# Patient Record
Sex: FEMALE
[De-identification: names, ages, dates, MRNs, and addresses within clinical notes are randomized per-mention and may not be internally consistent; named-entity substitution may affect disease eponyms.]

---

## 2018-08-14 ENCOUNTER — HOSPITAL ENCOUNTER (OUTPATIENT)
Dept: HOSPITAL 88 - NM | Age: 73
End: 2018-08-14
Attending: INTERNAL MEDICINE
Payer: COMMERCIAL

## 2018-08-14 DIAGNOSIS — L89.153: Primary | ICD-10-CM

## 2018-08-14 PROCEDURE — 78315 BONE IMAGING 3 PHASE: CPT

## 2018-08-14 NOTE — DIAGNOSTIC IMAGING REPORT
Bone Scan, three-phase



Reason for exam: 73 F s/p fall 5 weeks ago with fracture left lower leg and

right upper arm; now with stage 3 sacral ulcer.  Patient is insulin-dependent

diabetic



Radiopharmaceutical:  Tc-99m MDP 26 mCi



Comparison: None



Following intravenous administration of the radiopharmaceutical, dynamic flow

and immediate blood pool images of the pelvis and hips followed by 3.5-hour

delayed total body and selected spot images were obtained. 



Flow and blood pool images show symmetric distribution of tracer activity to

the pelvis and hips without focal abnormality.  



No abnormal accumulation of tracer is seen in the pelvis or hips on the delayed

images.



Impression:



No scan evidence of osteomyelitis in the pelvis or hips.





Signed by: Dr. Joanna Boone M.D. on 8/14/2018 7:11 PM